# Patient Record
Sex: FEMALE | Race: WHITE | NOT HISPANIC OR LATINO | Employment: FULL TIME | URBAN - METROPOLITAN AREA
[De-identification: names, ages, dates, MRNs, and addresses within clinical notes are randomized per-mention and may not be internally consistent; named-entity substitution may affect disease eponyms.]

---

## 2019-12-04 ENCOUNTER — HOSPITAL ENCOUNTER (EMERGENCY)
Facility: MEDICAL CENTER | Age: 39
End: 2019-12-04
Attending: EMERGENCY MEDICINE
Payer: COMMERCIAL

## 2019-12-04 ENCOUNTER — APPOINTMENT (OUTPATIENT)
Dept: RADIOLOGY | Facility: MEDICAL CENTER | Age: 39
End: 2019-12-04
Attending: EMERGENCY MEDICINE
Payer: COMMERCIAL

## 2019-12-04 VITALS
OXYGEN SATURATION: 98 % | DIASTOLIC BLOOD PRESSURE: 96 MMHG | TEMPERATURE: 97.9 F | RESPIRATION RATE: 18 BRPM | HEIGHT: 67 IN | WEIGHT: 174 LBS | SYSTOLIC BLOOD PRESSURE: 140 MMHG | BODY MASS INDEX: 27.31 KG/M2 | HEART RATE: 95 BPM

## 2019-12-04 DIAGNOSIS — S92.351A CLOSED FRACTURE OF BASE OF FIFTH METATARSAL BONE OF RIGHT FOOT: ICD-10-CM

## 2019-12-04 PROCEDURE — 73610 X-RAY EXAM OF ANKLE: CPT | Mod: RT

## 2019-12-04 PROCEDURE — 73630 X-RAY EXAM OF FOOT: CPT | Mod: RT

## 2019-12-04 PROCEDURE — 99283 EMERGENCY DEPT VISIT LOW MDM: CPT

## 2019-12-04 NOTE — ED PROVIDER NOTES
"ED Provider Note    CHIEF COMPLAINT  Chief Complaint   Patient presents with   • Ankle Injury   • Foot Pain       HPI  Berenice Ng is a 39 y.o. female who presents for evaluation of right lateral foot pain after stepping on her foot run.  Patient states she was walking across the living room when she excellently's stepped on her right foot while her toes were curled under.  She stepped directly onto her toes and felt a \"snap\" in the right side of her foot.  She had pain all the way up to her hip at the time however currently it is only in the lateral portion of the foot and ankle.    REVIEW OF SYSTEMS  GEN: No fevers or chills  SKIN: No rashes  MS: Right lateral foot pain.  NEURO: No sensory or motor changes.     SOCIAL HISTORY  Social History     Tobacco Use   • Smoking status: Never Smoker   • Smokeless tobacco: Never Used   Substance and Sexual Activity   • Alcohol use: Yes   • Drug use: Never   • Sexual activity: Not on file       SURGICAL HISTORY  patient denies any surgical history    CURRENT MEDICATIONS  Home Medications    **Home medications have not yet been reviewed for this encounter**         ALLERGIES  No Known Allergies    PHYSICAL EXAM  VITAL SIGNS: /96   Pulse 95   Temp 36.6 °C (97.9 °F) (Oral)   Resp 18   Ht 1.702 m (5' 7\")   Wt 78.9 kg (174 lb)   SpO2 98%   BMI 27.25 kg/m²    GEN: Alert in no apparent distress.  SKIN: Warm, Dry, No erythema, No rash.  CV: Regular rate and rythm, capillary refill less than 3 seconds to affected foot/toes  NEURO: Sensation intact to light touch to entire affected foot  MS: Tenderness and swelling noted to the base of the right fifth metatarsal area extending up toward the lateral malleolus.  There is no calf pain, Homans sign, knee tenderness or pain, no fibular head tenderness.  Patient is able to range knee and hip without distress      LABS  No results found for this or any previous visit.    RADIOLOGY  DX-FOOT-COMPLETE 3+ RIGHT   Final " Result      Apparent subtle nondisplaced fracture through the base of the fifth metatarsal with lateral soft tissue swelling.      DX-ANKLE 3+ VIEWS RIGHT   Final Result      Normal ankle series.          COURSE & MEDICAL DECISION MAKING  Pertinent Labs & Imaging studies reviewed. (See chart for details)  Patient arrives for evaluation of foot pain and has evidence for a small nondisplaced fracture to the base of the fifth metatarsal.  This could be a subtle Martinez fracture and I will place the patient in a boot with crutches and have her follow-up with the orthopedic surgeon.  She stated clear understanding she needed to keep weight off the foot until she sees the orthopedic surgeon.  She had no other findings or symptoms to suggest the need for imaging beyond plain films of her foot and ankle.  FINAL IMPRESSION  1. Closed fracture of base of fifth metatarsal bone of right foot             Electronically signed by: Nawaf Valdez, 12/4/2019 1:43 AM

## 2019-12-18 ENCOUNTER — OFFICE VISIT (OUTPATIENT)
Dept: MEDICAL GROUP | Facility: PHYSICIAN GROUP | Age: 39
End: 2019-12-18
Payer: COMMERCIAL

## 2019-12-18 VITALS
BODY MASS INDEX: 27 KG/M2 | OXYGEN SATURATION: 98 % | HEIGHT: 67 IN | RESPIRATION RATE: 16 BRPM | TEMPERATURE: 99.5 F | DIASTOLIC BLOOD PRESSURE: 84 MMHG | WEIGHT: 172 LBS | HEART RATE: 70 BPM | SYSTOLIC BLOOD PRESSURE: 124 MMHG

## 2019-12-18 DIAGNOSIS — Z00.00 WELLNESS EXAMINATION: ICD-10-CM

## 2019-12-18 DIAGNOSIS — S92.901S CLOSED FRACTURE OF RIGHT FOOT, SEQUELA: ICD-10-CM

## 2019-12-18 DIAGNOSIS — Z76.89 ENCOUNTER TO ESTABLISH CARE WITH NEW DOCTOR: ICD-10-CM

## 2019-12-18 PROBLEM — S92.901A CLOSED FRACTURE OF BONE OF RIGHT FOOT: Status: ACTIVE | Noted: 2019-12-18

## 2019-12-18 PROCEDURE — 99203 OFFICE O/P NEW LOW 30 MIN: CPT | Performed by: NURSE PRACTITIONER

## 2019-12-18 RX ORDER — FLUCONAZOLE 150 MG/1
TABLET ORAL
COMMUNITY
Start: 2019-10-04 | End: 2019-12-18

## 2019-12-18 RX ORDER — IBUPROFEN 600 MG/1
600 TABLET ORAL
COMMUNITY
Start: 2019-10-04 | End: 2020-02-11

## 2019-12-18 ASSESSMENT — PATIENT HEALTH QUESTIONNAIRE - PHQ9: CLINICAL INTERPRETATION OF PHQ2 SCORE: 0

## 2019-12-18 NOTE — PROGRESS NOTES
Chief Complaint   Patient presents with   • Establish Care       HISTORY OF PRESENT ILLNESS: Patient is a 39 y.o. female new patient who presents today to discuss the following issues:    Encounter to establish care with new doctor  Is here to establish care with a new provider.  She is due for labs.    Closed fracture of bone of right foot  Tripped in her living room.  Followed by Dr. Mares.  Selena in a walking boot.      Patient Active Problem List    Diagnosis Date Noted   • Encounter to establish care with new doctor 12/18/2019   • Closed fracture of bone of right foot 12/18/2019       Allergies:Patient has no known allergies.    Current Outpatient Medications   Medication Sig Dispense Refill   • ibuprofen (MOTRIN) 600 MG Tab Take 600 mg by mouth.       No current facility-administered medications for this visit.        Social History     Tobacco Use   • Smoking status: Never Smoker   • Smokeless tobacco: Never Used   Substance Use Topics   • Alcohol use: Yes     Comment: occ   • Drug use: Never       No family status information on file.   History reviewed. No pertinent family history.    Review of Systems:   Constitutional: Negative for fever, chills, weight loss and malaise/fatigue.   HENT: Negative for ear pain, nosebleeds, congestion, sore throat and neck pain.    Eyes: Negative for blurred vision.   Respiratory: Negative for cough, sputum production, shortness of breath and wheezing.    Cardiovascular: Negative for chest pain, palpitations, orthopnea and leg swelling.   Gastrointestinal: Negative for heartburn, nausea, vomiting and abdominal pain.   Genitourinary: Negative for dysuria, urgency and frequency.   Musculoskeletal: Negative for myalgias, joint pain, and back pain. Right foot broken.  Skin: Negative for rash and itching.   Neurological: Negative for dizziness, tingling, tremors, sensory change, focal weakness and headaches.   Endo/Heme/Allergies: Does not bruise/bleed easily.  "  Psychiatric/Behavioral: Negative for depression, suicidal ideas and memory loss.  The patient is not nervous/anxious and does not have insomnia.    All other systems reviewed and are negative except as in HPI.    Exam:  Blood Pressure 124/84   Pulse 70   Temperature 37.5 °C (99.5 °F)   Respiration 16   Height 1.702 m (5' 7\")   Weight 78 kg (172 lb)   Oxygen Saturation 98%   General:  Well nourished, well developed female in NAD  Head: Grossly normal.  Neck: Supple without JVD or bruit. Thyroid is not enlarged.  Pulmonary: Clear to ausculation. Normal effort. No rales, ronchi, or wheezing.  Cardiovascular: Regular rate and rhythm without murmur.   Abdomen:  Bowel sounds + x 4. Soft, non-tender, nondistended.  Extremities: No clubbing, cyanosis, or edema. Right foot in walking boot.  Skin: Intact with no obvious rashes or lesions.  Neuro: Grossly intact.  Psych: Alert and oriented x 3.  Mood and affect appropriate.    Medical decision-making and discussion: Berenice is here to establish with a new primary care provider.  We reviewed her past medical history and discussed her current medications.  Lab work was ordered. She will sign a records release for her previous provider, [facility], and she will plan to follow-up here as needed.         Assessment/Plan:  1. Encounter to establish care with new doctor     2. Closed fracture of right foot, sequela     3. Wellness examination  CBC WITH DIFFERENTIAL    Comp Metabolic Panel    Lipid Profile    TSH    VITAMIN D,25 HYDROXY       Return if symptoms worsen or fail to improve.    Please note that this dictation was created using voice recognition software. I have made every reasonable attempt to correct obvious errors, but I expect that there are errors of grammar and possibly content that I did not discover before finalizing the note.    "

## 2020-01-12 ENCOUNTER — OFFICE VISIT (OUTPATIENT)
Dept: URGENT CARE | Facility: PHYSICIAN GROUP | Age: 40
End: 2020-01-12
Payer: COMMERCIAL

## 2020-01-12 VITALS
RESPIRATION RATE: 16 BRPM | BODY MASS INDEX: 27.47 KG/M2 | DIASTOLIC BLOOD PRESSURE: 76 MMHG | HEIGHT: 67 IN | SYSTOLIC BLOOD PRESSURE: 112 MMHG | WEIGHT: 175 LBS | HEART RATE: 107 BPM | TEMPERATURE: 98.4 F | OXYGEN SATURATION: 96 %

## 2020-01-12 DIAGNOSIS — J01.90 ACUTE NON-RECURRENT SINUSITIS, UNSPECIFIED LOCATION: ICD-10-CM

## 2020-01-12 PROCEDURE — 99214 OFFICE O/P EST MOD 30 MIN: CPT | Performed by: PHYSICIAN ASSISTANT

## 2020-01-12 RX ORDER — AMOXICILLIN AND CLAVULANATE POTASSIUM 875; 125 MG/1; MG/1
1 TABLET, FILM COATED ORAL 2 TIMES DAILY
Qty: 14 TAB | Refills: 0 | Status: SHIPPED | OUTPATIENT
Start: 2020-01-12 | End: 2020-01-19

## 2020-01-12 RX ORDER — FLUCONAZOLE 150 MG/1
150 TABLET ORAL ONCE
Qty: 1 TAB | Refills: 0 | Status: SHIPPED | OUTPATIENT
Start: 2020-01-12 | End: 2020-01-12

## 2020-01-12 RX ORDER — BENZONATATE 200 MG/1
200 CAPSULE ORAL 2 TIMES DAILY PRN
Qty: 30 CAP | Refills: 0 | Status: SHIPPED | OUTPATIENT
Start: 2020-01-12 | End: 2020-02-11

## 2020-01-12 ASSESSMENT — ENCOUNTER SYMPTOMS
HOARSE VOICE: 0
NAUSEA: 0
VOMITING: 0
SHORTNESS OF BREATH: 0
PALPITATIONS: 0
WHEEZING: 0
FEVER: 1
DIARRHEA: 0
SWOLLEN GLANDS: 0
CHILLS: 1
SORE THROAT: 0
MYALGIAS: 1
ABDOMINAL PAIN: 0
COUGH: 1
SINUS PRESSURE: 1
HEADACHES: 1
SINUS PAIN: 1
SPUTUM PRODUCTION: 1

## 2020-01-12 NOTE — PROGRESS NOTES
"Subjective:      Berenice Ng is a 39 y.o. female who presents with Cough (congestion, sinus pressure  ear pain x 2 weeks)            Sinus Problem   This is a new problem. Episode onset: 2 weeks  The problem is unchanged. The maximum temperature recorded prior to her arrival was 101 - 101.9 F. The pain is moderate. Associated symptoms include chills, congestion, coughing, ear pain (bilateral), headaches and sinus pressure. Pertinent negatives include no hoarse voice, shortness of breath, sore throat or swollen glands. Past treatments include oral decongestants (OTC cough suppressants). Improvement on treatment: minimal relief        No past medical history on file.    No past surgical history on file.    No family history on file.    No Known Allergies    Medications, Allergies, and current problem list reviewed today in Epic    Review of Systems   Constitutional: Positive for chills, fever (last fever was yesterday ) and malaise/fatigue.   HENT: Positive for congestion, ear pain (bilateral), sinus pressure and sinus pain. Negative for ear discharge, hoarse voice and sore throat.    Respiratory: Positive for cough and sputum production. Negative for shortness of breath and wheezing.    Cardiovascular: Negative for chest pain, palpitations and leg swelling.   Gastrointestinal: Negative for abdominal pain, diarrhea, nausea and vomiting.   Musculoskeletal: Positive for myalgias.   Skin: Negative for rash.   Neurological: Positive for headaches.     All other systems reviewed and are negative.        Objective:     /76 (BP Location: Left arm, Patient Position: Sitting, BP Cuff Size: Adult)   Pulse (!) 107   Temp 36.9 °C (98.4 °F) (Temporal)   Resp 16   Ht 1.702 m (5' 7\")   Wt 79.4 kg (175 lb)   SpO2 96%   BMI 27.41 kg/m²      Physical Exam  Constitutional:       General: She is not in acute distress.     Appearance: She is not ill-appearing.   HENT:      Head: Normocephalic and atraumatic.      " Right Ear: Ear canal and external ear normal. A middle ear effusion is present.      Left Ear: Ear canal and external ear normal. A middle ear effusion is present.      Nose: Congestion and rhinorrhea present.      Right Sinus: Maxillary sinus tenderness present.      Left Sinus: Maxillary sinus tenderness present.      Mouth/Throat:      Mouth: Mucous membranes are moist.      Pharynx: Oropharynx is clear. No posterior oropharyngeal erythema.   Cardiovascular:      Rate and Rhythm: Normal rate and regular rhythm.      Heart sounds: Normal heart sounds. No murmur. No friction rub. No gallop.    Pulmonary:      Effort: Pulmonary effort is normal. No respiratory distress.      Breath sounds: Normal breath sounds. No wheezing, rhonchi or rales.   Lymphadenopathy:      Cervical: No cervical adenopathy.   Skin:     General: Skin is warm and dry.      Findings: No rash.   Neurological:      General: No focal deficit present.      Mental Status: She is alert and oriented to person, place, and time.   Psychiatric:         Mood and Affect: Mood normal.         Behavior: Behavior normal.         Thought Content: Thought content normal.         Judgment: Judgment normal.                 Assessment/Plan:     1. Acute non-recurrent sinusitis, unspecified location  amoxicillin-clavulanate (AUGMENTIN) 875-125 MG Tab    fluconazole (DIFLUCAN) 150 MG tablet    benzonatate (TESSALON) 200 MG capsule       Current Outpatient Medications:   •  amoxicillin-clavulanate (AUGMENTIN) 875-125 MG Tab, Take 1 Tab by mouth 2 times a day for 7 days., Disp: 14 Tab, Rfl: 0  •  fluconazole (DIFLUCAN) 150 MG tablet, Take 1 Tab by mouth Once for 1 dose., Disp: 1 Tab, Rfl: 0  •  benzonatate (TESSALON) 200 MG capsule, Take 1 Cap by mouth 2 times a day as needed for Cough., Disp: 30 Cap, Rfl: 0     Differential diagnoses, Supportive care, and indications for immediate follow-up discussed with patient.   Instructed to return to clinic or nearest emergency  department for any change in condition, further concerns, or worsening of symptoms.    The patient demonstrated a good understanding and agreed with the treatment plan.    Camila Mcgill P.A.-C.

## 2020-01-14 ENCOUNTER — TELEPHONE (OUTPATIENT)
Dept: MEDICAL GROUP | Facility: PHYSICIAN GROUP | Age: 40
End: 2020-01-14

## 2020-01-14 DIAGNOSIS — Z63.0 MARITAL PROBLEMS: ICD-10-CM

## 2020-01-14 SDOH — SOCIAL STABILITY - SOCIAL INSECURITY: PROBLEMS IN RELATIONSHIP WITH SPOUSE OR PARTNER: Z63.0

## 2020-01-14 NOTE — TELEPHONE ENCOUNTER
----- Message from Berenice Ng sent at 1/14/2020  9:41 AM PST -----  Regarding: Non-Urgent Medical Question  Contact: [phone]  Good Morning Jefferson, I hope this email finds you well.  My  and I would like to go to a marriage  and noticed that the one we are considering accepts our medical insurance.  Because of that, we need a referral (HMO).  Could you please consider that for us?  The facility we are looking at is [facility].  If you need any more information about it, please let me know.   Best,  Aria Ng

## 2020-02-11 ENCOUNTER — OFFICE VISIT (OUTPATIENT)
Dept: URGENT CARE | Facility: PHYSICIAN GROUP | Age: 40
End: 2020-02-11
Payer: COMMERCIAL

## 2020-02-11 VITALS
TEMPERATURE: 97.7 F | OXYGEN SATURATION: 98 % | SYSTOLIC BLOOD PRESSURE: 148 MMHG | DIASTOLIC BLOOD PRESSURE: 82 MMHG | HEART RATE: 112 BPM | WEIGHT: 175 LBS | BODY MASS INDEX: 27.47 KG/M2 | RESPIRATION RATE: 16 BRPM | HEIGHT: 67 IN

## 2020-02-11 DIAGNOSIS — J22 LOWER RESPIRATORY INFECTION (E.G., BRONCHITIS, PNEUMONIA, PNEUMONITIS, PULMONITIS): ICD-10-CM

## 2020-02-11 DIAGNOSIS — J06.9 URI, ACUTE: ICD-10-CM

## 2020-02-11 PROCEDURE — 99203 OFFICE O/P NEW LOW 30 MIN: CPT | Performed by: INTERNAL MEDICINE

## 2020-02-11 ASSESSMENT — ENCOUNTER SYMPTOMS
FEVER: 0
RHINORRHEA: 1
COUGH: 1
WHEEZING: 1
SHORTNESS OF BREATH: 1

## 2020-02-12 NOTE — PROGRESS NOTES
Subjective:     Berenice Ng is a 39 y.o. female who presents for Cough (x2days )       Cough   This is a new problem. The current episode started yesterday. The problem has been gradually worsening. The cough is productive of purulent sputum. Associated symptoms include nasal congestion, postnasal drip, rhinorrhea, shortness of breath and wheezing. Pertinent negatives include no fever.   History reviewed. No pertinent past medical history.History reviewed. No pertinent surgical history.  Social History     Socioeconomic History   • Marital status:      Spouse name: Not on file   • Number of children: Not on file   • Years of education: Not on file   • Highest education level: Not on file   Occupational History   • Not on file   Social Needs   • Financial resource strain: Not on file   • Food insecurity:     Worry: Not on file     Inability: Not on file   • Transportation needs:     Medical: Not on file     Non-medical: Not on file   Tobacco Use   • Smoking status: Never Smoker   • Smokeless tobacco: Never Used   Substance and Sexual Activity   • Alcohol use: Yes     Comment: occ   • Drug use: Never   • Sexual activity: Not on file   Lifestyle   • Physical activity:     Days per week: Not on file     Minutes per session: Not on file   • Stress: Not on file   Relationships   • Social connections:     Talks on phone: Not on file     Gets together: Not on file     Attends [employer] service: Not on file     Active member of club or organization: Not on file     Attends meetings of clubs or organizations: Not on file     Relationship status: Not on file   • Intimate partner violence:     Fear of current or ex partner: Not on file     Emotionally abused: Not on file     Physically abused: Not on file     Forced sexual activity: Not on file   Other Topics Concern   • Not on file   Social History Narrative   • Not on file    History reviewed. No pertinent family history. Review of Systems  "  Constitutional: Negative for fever.   HENT: Positive for postnasal drip and rhinorrhea.    Respiratory: Positive for cough, shortness of breath and wheezing.    All other systems reviewed and are negative.  No Known Allergies   Objective:   /82   Pulse (!) 112   Temp 36.5 °C (97.7 °F) (Temporal)   Resp 16   Ht 1.702 m (5' 7\")   Wt 79.4 kg (175 lb)   SpO2 98%   BMI 27.41 kg/m²   Physical Exam  Constitutional:       General: She is not in acute distress.     Appearance: She is well-developed.   HENT:      Head: Normocephalic and atraumatic.      Right Ear: Tympanic membrane, ear canal and external ear normal.      Left Ear: Tympanic membrane, ear canal and external ear normal.      Nose: Mucosal edema and rhinorrhea present.      Mouth/Throat:      Mouth: Mucous membranes are moist.      Pharynx: Oropharynx is clear. Posterior oropharyngeal erythema present.   Eyes:      Conjunctiva/sclera: Conjunctivae normal.   Neck:      Musculoskeletal: No neck rigidity.   Cardiovascular:      Rate and Rhythm: Normal rate and regular rhythm.   Pulmonary:      Effort: Pulmonary effort is normal. No respiratory distress.      Breath sounds: Rales present. No wheezing.   Lymphadenopathy:      Cervical: No cervical adenopathy.   Skin:     General: Skin is warm and dry.      Capillary Refill: Capillary refill takes less than 2 seconds.   Neurological:      Mental Status: She is alert and oriented to person, place, and time.      Sensory: No sensory deficit.      Deep Tendon Reflexes: Reflexes are normal and symmetric.   Psychiatric:         Mood and Affect: Mood normal.         Behavior: Behavior normal.           Assessment/Plan:   Assessment    1. URI, acute    2. Lower respiratory infection (e.g., bronchitis, pneumonia, pneumonitis, pulmonitis)    Advised patient to take Benadryl at night, Mucinex DM and, Tylenol, ibuprofen a lot of fluids and also vitamin C and zinc , flonase    Differential diagnosis, natural " history, supportive care, and indications for immediate follow-up discussed.

## 2021-01-06 NOTE — PROGRESS NOTES
Virtual Visit: Established Patient   This visit was conducted via Zoom using secure and encrypted videoconferencing technology. The patient was in a private location in the state of [place].    The patient's identity was confirmed and verbal consent was obtained for this virtual visit.    Subjective:   CC:   Chief Complaint   Patient presents with   • Establish Care   • Headache     ibuprofen refill       Berenice Ng is a 40 y.o. female presenting for evaluation and management of:    Tension headache  This is a chronic problem, stable.  Patient reports that ever since having her child about 3 years ago she occasionally gets a headache around the time of her period.  She reports that she does not have any light sensitivity, nausea, or vomiting when she has her headaches.  The headache is normally on both of her temples and feels like a pressure or on the back of her occipital area.  He has tried over-the-counter Tylenol and ibuprofen for this but it does not seem to help.  Her OB/GYN has given her 600 mg ibuprofen in the past and that really helps get rid of the headache.  She has these headaches now 2-3 times a year.    Nevus  This is a chronic problem, stable.  She reports that for the last 15 years she has had multiple on her body that she wants to get checked out by a provider.  She is particularly she has one on her left thigh and one on her right chest wall.  She reports that the mole on her thigh has slightly changed over the last 2 years and has gotten lighter in color and seems to be not as pronounced.  The mole on her chest wall has not changed in over 15 years.      ROS   Denies any recent fevers or chills. No nausea or vomiting. No chest pains or shortness of breath. Positive for nevus and headache.     No Known Allergies    Current medicines (including changes today)  Current Outpatient Medications   Medication Sig Dispense Refill   • ibuprofen (MOTRIN) 600 MG Tab Take 1 Tab by mouth every 6  "hours as needed. 10 Tab 1     No current facility-administered medications for this visit.        Patient Active Problem List    Diagnosis Date Noted   • Tension headache 01/07/2021   • Nevus 01/07/2021   • Encounter to establish care with new doctor 12/18/2019       Family History   Problem Relation Age of Onset   • No Known Problems Mother    • Dementia Maternal Grandmother         Alzheimers   • Parkinson's Disease Maternal Grandmother    • Glaucoma Maternal Grandmother    • Heart Disease Maternal Grandfather 60        2 MIs, CAD   • Dementia Maternal Grandfather        She  has no pertinent past medical history.  She  has a past surgical history that includes knee arthroscopy,joint debridment,open patella tendon reconsn (Left, 2008).       Objective:   Ht 1.702 m (5' 7\") Comment: per pt  Wt 76.7 kg (169 lb 3.2 oz) Comment: per pt  LMP 12/22/2020 (Within Days)   Breastfeeding No   BMI 26.50 kg/m²  RR: 12    Physical Exam:  Constitutional: Alert, no distress, well-groomed.  Skin: No rashes in visible areas.  Eye: Round. Conjunctiva clear, lids normal. No icterus.   ENMT: Lips pink without lesions, good dentition, moist mucous membranes. Phonation normal.  Neck: No masses, no thyromegaly. Moves freely without pain.  Respiratory: Unlabored respiratory effort, no cough or audible wheeze  Psych: Alert and oriented x3, normal affect and mood.       Assessment and Plan:   The following treatment plan was discussed:       1. Tension headache  This is a chronic, stable.  Patient reports that ever since having her child about 3 years ago which she occasionally gets what sounds like tension headaches around the time of her menstrual cycle.  She previously had an IUD in place that was recently removed due to recurrent infections.  Ever since having her IUD removed she reports her headaches have greatly improved.  Patient has had a prescription for 600 mg ibuprofen which she states always gets rid of her headache with 1 " dose.  She reports only getting these headaches 2-3 times a year.  Instructed patient to continue to take the 600 mg ibuprofen with food for her headaches as needed.  She will follow up with me if the symptoms are getting worse or she has any new concerns.  - ibuprofen (MOTRIN) 600 MG Tab; Take 1 Tab by mouth every 6 hours as needed.; Refill: 0    2. Nevus  This is chronic, stable.  Patient reports that she has 2 moles that she would like to be seen by a provider.  One is on her left thigh that she states is slightly changed in the last 2 years by getting lighter and seems to be not as pronounced.  He also has 1 mole on her right chest wall that she has had for her entire life and has not changed in the last 15 years.  I counseled the patient that given the virtual nature of our visit is difficult for me to fully evaluate these moles.  Educated her about warning signs for questionable skin cancer, and urged her to make an in person appointment with me if she would like them to be evaluated further.         Follow-up: Return in about 1 year (around 1/7/2022) for annual wellness.  Or sooner she would like her moles to be evaluated.  Follow-up on the labs with the patient once I receive the results.     Please note that this dictation was created using voice recognition software. I have made every reasonable attempt to correct obvious errors, but I expect that there are errors of grammar and possibly content that I did not discover before finalizing the note.

## 2021-01-07 ENCOUNTER — TELEMEDICINE (OUTPATIENT)
Dept: MEDICAL GROUP | Facility: PHYSICIAN GROUP | Age: 41
End: 2021-01-07
Payer: COMMERCIAL

## 2021-01-07 VITALS — BODY MASS INDEX: 26.56 KG/M2 | HEIGHT: 67 IN | WEIGHT: 169.2 LBS

## 2021-01-07 DIAGNOSIS — Z76.89 ENCOUNTER TO ESTABLISH CARE WITH NEW DOCTOR: ICD-10-CM

## 2021-01-07 DIAGNOSIS — D22.9 NEVUS: ICD-10-CM

## 2021-01-07 DIAGNOSIS — G44.209 TENSION HEADACHE: ICD-10-CM

## 2021-01-07 DIAGNOSIS — Z13.29 SCREENING FOR ENDOCRINE DISORDER: ICD-10-CM

## 2021-01-07 PROBLEM — S92.901A CLOSED FRACTURE OF BONE OF RIGHT FOOT: Status: RESOLVED | Noted: 2019-12-18 | Resolved: 2021-01-07

## 2021-01-07 PROCEDURE — 99213 OFFICE O/P EST LOW 20 MIN: CPT | Mod: 95,CR | Performed by: PHYSICIAN ASSISTANT

## 2021-01-07 RX ORDER — IBUPROFEN 600 MG/1
600 TABLET ORAL EVERY 6 HOURS PRN
Qty: 10 TAB | Refills: 1 | Status: ON HOLD | OUTPATIENT
Start: 2021-01-07 | End: 2021-11-17

## 2021-01-07 SDOH — HEALTH STABILITY: MENTAL HEALTH: HOW OFTEN DO YOU HAVE A DRINK CONTAINING ALCOHOL?: NEVER

## 2021-01-07 ASSESSMENT — PATIENT HEALTH QUESTIONNAIRE - PHQ9: CLINICAL INTERPRETATION OF PHQ2 SCORE: 0

## 2021-01-07 NOTE — ASSESSMENT & PLAN NOTE
This is a chronic problem, stable.  Patient reports that ever since having her child about 3 years ago she occasionally gets a headache around the time of her..  She reports that she does not have any light sensitivity, nausea, or vomiting when she has her headaches.  The headache is normally on both of her temples and feels like a pressure or on the back of her occipital area.  He has tried over-the-counter Tylenol and ibuprofen for this but it does not seem to help.  Her OB/GYN has given her 600 mg ibuprofen in the past and that really helps get rid of the headache.  She has these headaches now 2-3 times a year.

## 2021-01-07 NOTE — ASSESSMENT & PLAN NOTE
This is a chronic problem, stable.  She reports that for the last 15 years she has had multiple on her body that she wants to get checked out by a provider.  She is particularly she has one on her left thigh and one on her right chest wall.  She reports that the mole on her thigh has slightly changed over the last 2 years and has gotten lighter in color and seems to be not as pronounced.  The mole on her chest wall has not changed in over 15 years.

## 2021-09-15 ENCOUNTER — HOSPITAL ENCOUNTER (OUTPATIENT)
Facility: MEDICAL CENTER | Age: 41
End: 2021-09-15
Attending: OBSTETRICS & GYNECOLOGY
Payer: COMMERCIAL

## 2021-09-15 PROCEDURE — 86769 SARS-COV-2 COVID-19 ANTIBODY: CPT

## 2021-09-15 PROCEDURE — 84443 ASSAY THYROID STIM HORMONE: CPT

## 2021-09-15 PROCEDURE — 84146 ASSAY OF PROLACTIN: CPT

## 2021-09-16 LAB
PROLACTIN SERPL-MCNC: 17.4 NG/ML (ref 2.8–26)
SARS-COV-2 AB SERPL QL IA: NORMAL
TSH SERPL DL<=0.005 MIU/L-ACNC: 1.61 UIU/ML (ref 0.38–5.33)

## 2021-09-23 ENCOUNTER — HOSPITAL ENCOUNTER (OUTPATIENT)
Facility: MEDICAL CENTER | Age: 41
End: 2021-09-23
Attending: FAMILY MEDICINE
Payer: COMMERCIAL

## 2021-09-23 ENCOUNTER — OFFICE VISIT (OUTPATIENT)
Dept: URGENT CARE | Facility: PHYSICIAN GROUP | Age: 41
End: 2021-09-23
Payer: COMMERCIAL

## 2021-09-23 VITALS
WEIGHT: 180 LBS | BODY MASS INDEX: 28.25 KG/M2 | SYSTOLIC BLOOD PRESSURE: 124 MMHG | TEMPERATURE: 98.1 F | RESPIRATION RATE: 20 BRPM | HEART RATE: 96 BPM | OXYGEN SATURATION: 96 % | DIASTOLIC BLOOD PRESSURE: 68 MMHG | HEIGHT: 67 IN

## 2021-09-23 DIAGNOSIS — Z20.822 EXPOSURE TO COVID-19 VIRUS: ICD-10-CM

## 2021-09-23 PROCEDURE — U0003 INFECTIOUS AGENT DETECTION BY NUCLEIC ACID (DNA OR RNA); SEVERE ACUTE RESPIRATORY SYNDROME CORONAVIRUS 2 (SARS-COV-2) (CORONAVIRUS DISEASE [COVID-19]), AMPLIFIED PROBE TECHNIQUE, MAKING USE OF HIGH THROUGHPUT TECHNOLOGIES AS DESCRIBED BY CMS-2020-01-R: HCPCS

## 2021-09-23 PROCEDURE — 99212 OFFICE O/P EST SF 10 MIN: CPT | Mod: CS | Performed by: FAMILY MEDICINE

## 2021-09-23 PROCEDURE — U0005 INFEC AGEN DETEC AMPLI PROBE: HCPCS

## 2021-09-23 NOTE — PROGRESS NOTES
"Subjective     Aria Ng is a 41 y.o. female who presents with Other (exposed to covid. )      - This is a pleasant and nontoxic appearing 41 y.o. female with c/o exposed to c19 about 5-6 days ago. Asymptomatic. Wants a c19 test       ALLERGIES:  Other drug     PMH:  History reviewed. No pertinent past medical history.     PSH:  Past Surgical History:   Procedure Laterality Date   • KNEE ARTHROSCOPY,JOINT DEBRIDMENT,OPEN PATELLA TENDON RECONSN Left 2008    Patient reports having multiple scopes of her left knee with the last one being in 2008       MEDS:    Current Outpatient Medications:   •  ibuprofen (MOTRIN) 600 MG Tab, Take 1 Tab by mouth every 6 hours as needed., Disp: 10 Tab, Rfl: 1    ** I have documented what I find to be significant in regards to past medical, social, family and surgical history  in my HPI or under PMH/PSH/FH review section, otherwise it is noncontributory **           HPI    Review of Systems   All other systems reviewed and are negative.             Objective     /68 (BP Location: Left arm, Patient Position: Sitting, BP Cuff Size: Adult)   Pulse 96   Temp 36.7 °C (98.1 °F) (Temporal)   Resp 20   Ht 1.702 m (5' 7\")   Wt 81.6 kg (180 lb)   SpO2 96%   BMI 28.19 kg/m²      Physical Exam  Vitals and nursing note reviewed.   Constitutional:       General: She is not in acute distress.     Appearance: Normal appearance. She is well-developed.   HENT:      Head: Normocephalic.   Cardiovascular:      Heart sounds: Normal heart sounds. No murmur heard.     Pulmonary:      Effort: Pulmonary effort is normal. No respiratory distress.   Skin:     Coloration: Skin is not jaundiced or pale.   Neurological:      Mental Status: She is alert.      Motor: No abnormal muscle tone.   Psychiatric:         Mood and Affect: Mood normal.         Behavior: Behavior normal.              Assessment & Plan          1. Exposure to COVID-19 virus         - Dx, plan & d/c instructions discussed "   - f/ prn

## 2021-09-24 LAB
COVID ORDER STATUS COVID19: NORMAL
SARS-COV-2 RNA RESP QL NAA+PROBE: NOTDETECTED
SPECIMEN SOURCE: NORMAL

## 2021-11-04 ENCOUNTER — PRE-ADMISSION TESTING (OUTPATIENT)
Dept: ADMISSIONS | Facility: MEDICAL CENTER | Age: 41
End: 2021-11-04
Attending: OBSTETRICS & GYNECOLOGY
Payer: COMMERCIAL

## 2021-11-04 DIAGNOSIS — Z01.812 PRE-OPERATIVE LABORATORY EXAMINATION: ICD-10-CM

## 2021-11-04 LAB
ABO GROUP BLD: NORMAL
ALBUMIN SERPL BCP-MCNC: 4.4 G/DL (ref 3.2–4.9)
ALBUMIN/GLOB SERPL: 1.7 G/DL
ALP SERPL-CCNC: 59 U/L (ref 30–99)
ALT SERPL-CCNC: 16 U/L (ref 2–50)
ANION GAP SERPL CALC-SCNC: 9 MMOL/L (ref 7–16)
APPEARANCE UR: CLEAR
AST SERPL-CCNC: 18 U/L (ref 12–45)
BACTERIA #/AREA URNS HPF: NEGATIVE /HPF
BASOPHILS # BLD AUTO: 0.9 % (ref 0–1.8)
BASOPHILS # BLD: 0.06 K/UL (ref 0–0.12)
BILIRUB SERPL-MCNC: 0.4 MG/DL (ref 0.1–1.5)
BILIRUB UR QL STRIP.AUTO: NEGATIVE
BLD GP AB SCN SERPL QL: NORMAL
BUN SERPL-MCNC: 6 MG/DL (ref 8–22)
CALCIUM SERPL-MCNC: 9.1 MG/DL (ref 8.5–10.5)
CHLORIDE SERPL-SCNC: 105 MMOL/L (ref 96–112)
CO2 SERPL-SCNC: 26 MMOL/L (ref 20–33)
COLOR UR: YELLOW
CREAT SERPL-MCNC: 0.55 MG/DL (ref 0.5–1.4)
EOSINOPHIL # BLD AUTO: 0.19 K/UL (ref 0–0.51)
EOSINOPHIL NFR BLD: 2.7 % (ref 0–6.9)
EPI CELLS #/AREA URNS HPF: NEGATIVE /HPF
ERYTHROCYTE [DISTWIDTH] IN BLOOD BY AUTOMATED COUNT: 45 FL (ref 35.9–50)
GLOBULIN SER CALC-MCNC: 2.6 G/DL (ref 1.9–3.5)
GLUCOSE SERPL-MCNC: 89 MG/DL (ref 65–99)
GLUCOSE UR STRIP.AUTO-MCNC: NEGATIVE MG/DL
HCG SERPL QL: NEGATIVE
HCT VFR BLD AUTO: 43.4 % (ref 37–47)
HGB BLD-MCNC: 14.7 G/DL (ref 12–16)
HYALINE CASTS #/AREA URNS LPF: NORMAL /LPF
IMM GRANULOCYTES # BLD AUTO: 0.02 K/UL (ref 0–0.11)
IMM GRANULOCYTES NFR BLD AUTO: 0.3 % (ref 0–0.9)
KETONES UR STRIP.AUTO-MCNC: NEGATIVE MG/DL
LEUKOCYTE ESTERASE UR QL STRIP.AUTO: NEGATIVE
LYMPHOCYTES # BLD AUTO: 2.83 K/UL (ref 1–4.8)
LYMPHOCYTES NFR BLD: 40.3 % (ref 22–41)
MCH RBC QN AUTO: 31.1 PG (ref 27–33)
MCHC RBC AUTO-ENTMCNC: 33.9 G/DL (ref 33.6–35)
MCV RBC AUTO: 91.8 FL (ref 81.4–97.8)
MICRO URNS: ABNORMAL
MONOCYTES # BLD AUTO: 0.57 K/UL (ref 0–0.85)
MONOCYTES NFR BLD AUTO: 8.1 % (ref 0–13.4)
NEUTROPHILS # BLD AUTO: 3.36 K/UL (ref 2–7.15)
NEUTROPHILS NFR BLD: 47.7 % (ref 44–72)
NITRITE UR QL STRIP.AUTO: NEGATIVE
NRBC # BLD AUTO: 0 K/UL
NRBC BLD-RTO: 0 /100 WBC
PH UR STRIP.AUTO: 7 [PH] (ref 5–8)
PLATELET # BLD AUTO: 312 K/UL (ref 164–446)
PMV BLD AUTO: 9.4 FL (ref 9–12.9)
POTASSIUM SERPL-SCNC: 4.2 MMOL/L (ref 3.6–5.5)
PROT SERPL-MCNC: 7 G/DL (ref 6–8.2)
PROT UR QL STRIP: NEGATIVE MG/DL
RBC # BLD AUTO: 4.73 M/UL (ref 4.2–5.4)
RBC # URNS HPF: NORMAL /HPF
RBC UR QL AUTO: ABNORMAL
RH BLD: NORMAL
SODIUM SERPL-SCNC: 140 MMOL/L (ref 135–145)
SP GR UR STRIP.AUTO: 1.01
UROBILINOGEN UR STRIP.AUTO-MCNC: 0.2 MG/DL
WBC # BLD AUTO: 7 K/UL (ref 4.8–10.8)
WBC #/AREA URNS HPF: NORMAL /HPF

## 2021-11-04 PROCEDURE — 80053 COMPREHEN METABOLIC PANEL: CPT

## 2021-11-04 PROCEDURE — 86850 RBC ANTIBODY SCREEN: CPT

## 2021-11-04 PROCEDURE — 86900 BLOOD TYPING SEROLOGIC ABO: CPT

## 2021-11-04 PROCEDURE — 85025 COMPLETE CBC W/AUTO DIFF WBC: CPT

## 2021-11-04 PROCEDURE — 84703 CHORIONIC GONADOTROPIN ASSAY: CPT

## 2021-11-04 PROCEDURE — 36415 COLL VENOUS BLD VENIPUNCTURE: CPT

## 2021-11-04 PROCEDURE — 86901 BLOOD TYPING SEROLOGIC RH(D): CPT

## 2021-11-04 PROCEDURE — 81001 URINALYSIS AUTO W/SCOPE: CPT

## 2021-11-12 ENCOUNTER — PRE-ADMISSION TESTING (OUTPATIENT)
Dept: ADMISSIONS | Facility: MEDICAL CENTER | Age: 41
End: 2021-11-12
Attending: OBSTETRICS & GYNECOLOGY
Payer: COMMERCIAL

## 2021-11-12 DIAGNOSIS — Z01.812 PRE-OPERATIVE LABORATORY EXAMINATION: ICD-10-CM

## 2021-11-12 PROCEDURE — U0003 INFECTIOUS AGENT DETECTION BY NUCLEIC ACID (DNA OR RNA); SEVERE ACUTE RESPIRATORY SYNDROME CORONAVIRUS 2 (SARS-COV-2) (CORONAVIRUS DISEASE [COVID-19]), AMPLIFIED PROBE TECHNIQUE, MAKING USE OF HIGH THROUGHPUT TECHNOLOGIES AS DESCRIBED BY CMS-2020-01-R: HCPCS

## 2021-11-12 PROCEDURE — U0005 INFEC AGEN DETEC AMPLI PROBE: HCPCS

## 2021-11-13 LAB — COVID ORDER STATUS COVID19: NORMAL

## 2021-11-14 LAB
SARS-COV-2 RNA RESP QL NAA+PROBE: NOTDETECTED
SPECIMEN SOURCE: NORMAL

## 2021-11-17 ENCOUNTER — HOSPITAL ENCOUNTER (OUTPATIENT)
Facility: MEDICAL CENTER | Age: 41
End: 2021-11-17
Attending: OBSTETRICS & GYNECOLOGY | Admitting: OBSTETRICS & GYNECOLOGY
Payer: COMMERCIAL

## 2021-11-17 ENCOUNTER — ANESTHESIA (OUTPATIENT)
Dept: SURGERY | Facility: MEDICAL CENTER | Age: 41
End: 2021-11-17
Payer: COMMERCIAL

## 2021-11-17 ENCOUNTER — ANESTHESIA EVENT (OUTPATIENT)
Dept: SURGERY | Facility: MEDICAL CENTER | Age: 41
End: 2021-11-17
Payer: COMMERCIAL

## 2021-11-17 VITALS
BODY MASS INDEX: 27.65 KG/M2 | WEIGHT: 176.15 LBS | DIASTOLIC BLOOD PRESSURE: 70 MMHG | HEART RATE: 88 BPM | OXYGEN SATURATION: 96 % | HEIGHT: 67 IN | RESPIRATION RATE: 16 BRPM | TEMPERATURE: 98 F | SYSTOLIC BLOOD PRESSURE: 125 MMHG

## 2021-11-17 DIAGNOSIS — R11.0 NAUSEA: ICD-10-CM

## 2021-11-17 DIAGNOSIS — G89.18 POSTOPERATIVE PAIN: ICD-10-CM

## 2021-11-17 LAB
ABO + RH BLD: NORMAL
HCG UR QL: NEGATIVE
PATHOLOGY CONSULT NOTE: NORMAL

## 2021-11-17 PROCEDURE — 700101 HCHG RX REV CODE 250: Performed by: ANESTHESIOLOGY

## 2021-11-17 PROCEDURE — 700101 HCHG RX REV CODE 250: Performed by: OBSTETRICS & GYNECOLOGY

## 2021-11-17 PROCEDURE — 160002 HCHG RECOVERY MINUTES (STAT): Performed by: OBSTETRICS & GYNECOLOGY

## 2021-11-17 PROCEDURE — 160009 HCHG ANES TIME/MIN: Performed by: OBSTETRICS & GYNECOLOGY

## 2021-11-17 PROCEDURE — 160029 HCHG SURGERY MINUTES - 1ST 30 MINS LEVEL 4: Performed by: OBSTETRICS & GYNECOLOGY

## 2021-11-17 PROCEDURE — 160025 RECOVERY II MINUTES (STATS): Performed by: OBSTETRICS & GYNECOLOGY

## 2021-11-17 PROCEDURE — 88305 TISSUE EXAM BY PATHOLOGIST: CPT

## 2021-11-17 PROCEDURE — 700111 HCHG RX REV CODE 636 W/ 250 OVERRIDE (IP): Performed by: ANESTHESIOLOGY

## 2021-11-17 PROCEDURE — 160041 HCHG SURGERY MINUTES - EA ADDL 1 MIN LEVEL 4: Performed by: OBSTETRICS & GYNECOLOGY

## 2021-11-17 PROCEDURE — 160048 HCHG OR STATISTICAL LEVEL 1-5: Performed by: OBSTETRICS & GYNECOLOGY

## 2021-11-17 PROCEDURE — 502587 HCHG PACK, D&C: Performed by: OBSTETRICS & GYNECOLOGY

## 2021-11-17 PROCEDURE — 81025 URINE PREGNANCY TEST: CPT

## 2021-11-17 PROCEDURE — 160035 HCHG PACU - 1ST 60 MINS PHASE I: Performed by: OBSTETRICS & GYNECOLOGY

## 2021-11-17 PROCEDURE — 160046 HCHG PACU - 1ST 60 MINS PHASE II: Performed by: OBSTETRICS & GYNECOLOGY

## 2021-11-17 RX ORDER — MIDAZOLAM HYDROCHLORIDE 1 MG/ML
INJECTION INTRAMUSCULAR; INTRAVENOUS PRN
Status: DISCONTINUED | OUTPATIENT
Start: 2021-11-17 | End: 2021-11-17 | Stop reason: SURG

## 2021-11-17 RX ORDER — SODIUM CHLORIDE, SODIUM LACTATE, POTASSIUM CHLORIDE, CALCIUM CHLORIDE 600; 310; 30; 20 MG/100ML; MG/100ML; MG/100ML; MG/100ML
INJECTION, SOLUTION INTRAVENOUS CONTINUOUS
Status: DISCONTINUED | OUTPATIENT
Start: 2021-11-17 | End: 2021-11-17 | Stop reason: HOSPADM

## 2021-11-17 RX ORDER — KETOROLAC TROMETHAMINE 30 MG/ML
INJECTION, SOLUTION INTRAMUSCULAR; INTRAVENOUS PRN
Status: DISCONTINUED | OUTPATIENT
Start: 2021-11-17 | End: 2021-11-17 | Stop reason: SURG

## 2021-11-17 RX ORDER — HALOPERIDOL 5 MG/ML
1 INJECTION INTRAMUSCULAR
Status: DISCONTINUED | OUTPATIENT
Start: 2021-11-17 | End: 2021-11-17 | Stop reason: HOSPADM

## 2021-11-17 RX ORDER — ONDANSETRON 2 MG/ML
4 INJECTION INTRAMUSCULAR; INTRAVENOUS
Status: DISCONTINUED | OUTPATIENT
Start: 2021-11-17 | End: 2021-11-17 | Stop reason: HOSPADM

## 2021-11-17 RX ORDER — OXYCODONE HCL 5 MG/5 ML
10 SOLUTION, ORAL ORAL
Status: DISCONTINUED | OUTPATIENT
Start: 2021-11-17 | End: 2021-11-17 | Stop reason: HOSPADM

## 2021-11-17 RX ORDER — SODIUM CHLORIDE, SODIUM LACTATE, POTASSIUM CHLORIDE, CALCIUM CHLORIDE 600; 310; 30; 20 MG/100ML; MG/100ML; MG/100ML; MG/100ML
INJECTION, SOLUTION INTRAVENOUS CONTINUOUS
Status: ACTIVE | OUTPATIENT
Start: 2021-11-17 | End: 2021-11-17

## 2021-11-17 RX ORDER — MEPERIDINE HYDROCHLORIDE 25 MG/ML
6.25 INJECTION INTRAMUSCULAR; INTRAVENOUS; SUBCUTANEOUS
Status: DISCONTINUED | OUTPATIENT
Start: 2021-11-17 | End: 2021-11-17 | Stop reason: HOSPADM

## 2021-11-17 RX ORDER — PROMETHAZINE HYDROCHLORIDE 25 MG/1
12.5 SUPPOSITORY RECTAL EVERY 4 HOURS PRN
Status: DISCONTINUED | OUTPATIENT
Start: 2021-11-17 | End: 2021-11-17 | Stop reason: HOSPADM

## 2021-11-17 RX ORDER — HYDROMORPHONE HYDROCHLORIDE 1 MG/ML
0.2 INJECTION, SOLUTION INTRAMUSCULAR; INTRAVENOUS; SUBCUTANEOUS
Status: DISCONTINUED | OUTPATIENT
Start: 2021-11-17 | End: 2021-11-17 | Stop reason: HOSPADM

## 2021-11-17 RX ORDER — DIPHENHYDRAMINE HYDROCHLORIDE 50 MG/ML
12.5 INJECTION INTRAMUSCULAR; INTRAVENOUS
Status: DISCONTINUED | OUTPATIENT
Start: 2021-11-17 | End: 2021-11-17 | Stop reason: HOSPADM

## 2021-11-17 RX ORDER — BUPIVACAINE HYDROCHLORIDE AND EPINEPHRINE 2.5; 5 MG/ML; UG/ML
INJECTION, SOLUTION EPIDURAL; INFILTRATION; INTRACAUDAL; PERINEURAL
Status: DISCONTINUED | OUTPATIENT
Start: 2021-11-17 | End: 2021-11-17 | Stop reason: HOSPADM

## 2021-11-17 RX ORDER — IBUPROFEN 600 MG/1
600 TABLET ORAL EVERY 6 HOURS PRN
Qty: 30 TABLET | Refills: 0 | Status: SHIPPED | OUTPATIENT
Start: 2021-11-17

## 2021-11-17 RX ORDER — HYDROMORPHONE HYDROCHLORIDE 1 MG/ML
0.1 INJECTION, SOLUTION INTRAMUSCULAR; INTRAVENOUS; SUBCUTANEOUS
Status: DISCONTINUED | OUTPATIENT
Start: 2021-11-17 | End: 2021-11-17 | Stop reason: HOSPADM

## 2021-11-17 RX ORDER — LIDOCAINE HYDROCHLORIDE 20 MG/ML
INJECTION, SOLUTION EPIDURAL; INFILTRATION; INTRACAUDAL; PERINEURAL PRN
Status: DISCONTINUED | OUTPATIENT
Start: 2021-11-17 | End: 2021-11-17 | Stop reason: SURG

## 2021-11-17 RX ORDER — OXYCODONE HCL 5 MG/5 ML
5 SOLUTION, ORAL ORAL
Status: DISCONTINUED | OUTPATIENT
Start: 2021-11-17 | End: 2021-11-17 | Stop reason: HOSPADM

## 2021-11-17 RX ORDER — HYDROMORPHONE HYDROCHLORIDE 1 MG/ML
0.4 INJECTION, SOLUTION INTRAMUSCULAR; INTRAVENOUS; SUBCUTANEOUS
Status: DISCONTINUED | OUTPATIENT
Start: 2021-11-17 | End: 2021-11-17 | Stop reason: HOSPADM

## 2021-11-17 RX ORDER — BUPIVACAINE HYDROCHLORIDE 2.5 MG/ML
INJECTION, SOLUTION EPIDURAL; INFILTRATION; INTRACAUDAL
Status: DISCONTINUED
Start: 2021-11-17 | End: 2021-11-17 | Stop reason: HOSPADM

## 2021-11-17 RX ORDER — EPINEPHRINE 1 MG/ML(1)
AMPUL (ML) INJECTION
Status: DISCONTINUED
Start: 2021-11-17 | End: 2021-11-17 | Stop reason: HOSPADM

## 2021-11-17 RX ORDER — ONDANSETRON 2 MG/ML
INJECTION INTRAMUSCULAR; INTRAVENOUS PRN
Status: DISCONTINUED | OUTPATIENT
Start: 2021-11-17 | End: 2021-11-17 | Stop reason: SURG

## 2021-11-17 RX ORDER — ONDANSETRON 4 MG/1
4 TABLET, ORALLY DISINTEGRATING ORAL EVERY 6 HOURS PRN
Qty: 10 TABLET | Refills: 0 | Status: SHIPPED | OUTPATIENT
Start: 2021-11-17 | End: 2022-02-25

## 2021-11-17 RX ORDER — SIMETHICONE 125 MG
125 TABLET,CHEWABLE ORAL 3 TIMES DAILY PRN
Status: DISCONTINUED | OUTPATIENT
Start: 2021-11-17 | End: 2021-11-17 | Stop reason: HOSPADM

## 2021-11-17 RX ADMIN — PROPOFOL 200 MG: 10 INJECTION, EMULSION INTRAVENOUS at 07:56

## 2021-11-17 RX ADMIN — MIDAZOLAM HYDROCHLORIDE 2 MG: 1 INJECTION, SOLUTION INTRAMUSCULAR; INTRAVENOUS at 07:56

## 2021-11-17 RX ADMIN — MEPERIDINE HYDROCHLORIDE 6.25 MG: 25 INJECTION INTRAMUSCULAR; INTRAVENOUS; SUBCUTANEOUS at 09:03

## 2021-11-17 RX ADMIN — LIDOCAINE HYDROCHLORIDE 100 MG: 20 INJECTION, SOLUTION EPIDURAL; INFILTRATION; INTRACAUDAL at 07:56

## 2021-11-17 RX ADMIN — KETOROLAC TROMETHAMINE 30 MG: 30 INJECTION, SOLUTION INTRAMUSCULAR at 08:50

## 2021-11-17 RX ADMIN — FENTANYL CITRATE 100 MCG: 50 INJECTION, SOLUTION INTRAMUSCULAR; INTRAVENOUS at 07:56

## 2021-11-17 RX ADMIN — ONDANSETRON 4 MG: 2 INJECTION INTRAMUSCULAR; INTRAVENOUS at 08:50

## 2021-11-17 ASSESSMENT — FIBROSIS 4 INDEX: FIB4 SCORE: .5769230769230769231

## 2021-11-17 NOTE — DISCHARGE INSTRUCTIONS
ACTIVITY: Rest and take it easy for the first 24 hours.  A responsible adult is recommended to remain with you during that time.  It is normal to feel sleepy.  We encourage you to not do anything that requires balance, judgment or coordination.    MILD FLU-LIKE SYMPTOMS ARE NORMAL. YOU MAY EXPERIENCE GENERALIZED MUSCLE ACHES, THROAT IRRITATION, HEADACHE AND/OR SOME NAUSEA.    FOR 24 HOURS DO NOT:  Drive, operate machinery or run household appliances.  Drink beer or alcoholic beverages.   Make important decisions or sign legal documents.    SPECIAL INSTRUCTIONS: SEE ATTACHED HANDOUT    DIET: To avoid nausea, slowly advance diet as tolerated, avoiding spicy or greasy foods for the first day.  Add more substantial food to your diet according to your physician's instructions.  INCREASE FLUIDS AND FIBER TO AVOID CONSTIPATION.    SURGICAL DRESSING/BATHING: SEE ATTACHED HANDOUT    FOLLOW-UP APPOINTMENT:  A follow-up appointment should be arranged with your doctor; call to schedule.    You should CALL YOUR PHYSICIAN if you develop:  Fever greater than 101 degrees F.  Pain not relieved by medication, or persistent nausea or vomiting.  Excessive bleeding (blood soaking through dressing) or unexpected drainage from the wound.  Extreme redness or swelling around the incision site, drainage of pus or foul smelling drainage.  Inability to urinate or empty your bladder within 8 hours.  Problems with breathing or chest pain.    You should call 911 if you develop problems with breathing or chest pain.  If you are unable to contact your doctor or surgical center, you should go to the nearest emergency room or urgent care center.  Physician's telephone #: DR. LEONARD [phone]    If any questions arise, call your doctor.  If your doctor is not available, please feel free to call the Surgical Center at [phone]. The Contact Center is open Monday through Friday 7AM to 5PM and may speak to a nurse at [phone], or toll  free at [phone].     A registered nurse may call you a few days after your surgery to see how you are doing after your procedure.    MEDICATIONS: Resume taking daily medication.  Take prescribed pain medication with food.  If no medication is prescribed, you may take non-aspirin pain medication if needed.  PAIN MEDICATION CAN BE VERY CONSTIPATING.  Take a stool softener or laxative such as senokot, pericolace, or milk of magnesia if needed.    Prescription given for _zofran and motrin.  Last pain medication given at ______________________.    If your physician has prescribed pain medication that includes Acetaminophen (Tylenol), do not take additional Acetaminophen (Tylenol) while taking the prescribed medication.    Depression / Suicide Risk    As you are discharged from this [facility] facility, it is important to learn how to keep safe from harming yourself.    Recognize the warning signs:  · Abrupt changes in personality, positive or negative- including increase in energy   · Giving away possessions  · Change in eating patterns- significant weight changes-  positive or negative  · Change in sleeping patterns- unable to sleep or sleeping all the time   · Unwillingness or inability to communicate  · Depression  · Unusual sadness, discouragement and loneliness  · Talk of wanting to die  · Neglect of personal appearance   · Rebelliousness- reckless behavior  · Withdrawal from people/activities they love  · Confusion- inability to concentrate     If you or a loved one observes any of these behaviors or has concerns about self-harm, here's what you can do:  · Talk about it- your feelings and reasons for harming yourself  · Remove any means that you might use to hurt yourself (examples: pills, rope, extension cords, firearm)  · Get professional help from the community (Mental Health, Substance Abuse, psychological counseling)  · Do not be alone:Call your Safe Contact- someone whom you trust who will be there  for you.  · Call your local CRISIS HOTLINE 172-6558 or 222-429-6522  · Call your local Children's Mobile Crisis Response Team [place] (059) 574-6166 or www.Employma  · Call the toll free National Suicide Prevention Hotlines   · National Suicide Prevention Lifeline 915-190-NOZI (5171)  · National [employer] Line Network 800-SUICIDE (938-5228)

## 2021-11-17 NOTE — OR NURSING
"0839: patient arrived from OR, report received, attached to monitoring. VSS, patient oxygenating well on 6 L oxymask, peripad in place, no blood noted at this moment    0901: telephone updates to , Jose David    0903: IV demerol 6.25 mg given for shivering, patient denies pain and nausea at this moment     0930: patient up to bathroom to void, patient reports small \"trickle\", RN unable to visualize urine, will attempt to void one more time, patient changed into clothes,     0942: patient to phase II,  brought to bedside, minimal shivering now, pain tolerable, denies nausea, tolerating PO    0959: discharge instructions given to , questions answered    1029: patient up to bathroom to void, void successful, PIV d/c tip intact    1034: patient d/c home in stable condition, vss, denies pain and nausea, all belongings with patient and accounted for, escorted out via wheelchair with JAYESH Moreno,               " [Negative] : Neurological

## 2021-11-17 NOTE — ANESTHESIA TIME REPORT
Anesthesia Start and Stop Event Times     Date Time Event    11/17/2021 0756 Ready for Procedure     0756 Anesthesia Start     0857 Anesthesia Stop        Responsible Staff  11/17/21    Name Role Begin End    Arslan Walker M.D. Anesth 0756 0857        Preop Diagnosis (Free Text):  Pre-op Diagnosis     MENORRHAGIA, ENDOMETRIAL POLYP        Preop Diagnosis (Codes):    Premium Reason  Non-Premium    Comments:

## 2021-11-17 NOTE — OR SURGEON
Immediate Post OP Note    PreOp Diagnosis: menorraghia, suspected polyp, desires ablation    PostOp Diagnosis: same      Procedure(s):  HYSTEROSCOPY, WITH TISSUE REMOVAL, USING   HYSTEROSCOPY, WITH ENDOMETRIAL RADIOFREQUENCY ABLATION -  - Wound Class: Clean Contaminated    Surgeon(s):  Jessica Wei M.D.    Anesthesiologist/Type of Anesthesia:  Anesthesiologist: Arslan Walker M.D./General    Surgical Staff:  Circulator: Kim Marroquin R.N.  Relief Circulator: Natalya Araiza R.N.  Scrub Person: Elsy Veronica    Specimens removed if any:  ID Type Source Tests Collected by Time Destination   A : endometrial sample Other Other PATHOLOGY SPECIMEN Jessica Wei M.D. 11/17/2021  8:30 AM        Estimated Blood Loss: <25ml    Findings: normal endometrium, no polyp was seen; normal tubal ostia    Complications: none; dict [id number]        11/17/2021 8:33 AM Jessica Wei M.D.

## 2021-11-17 NOTE — ANESTHESIA PROCEDURE NOTES
Airway    Date/Time: 11/17/2021 7:57 AM  Performed by: Arslan Walker M.D.  Authorized by: Arslan Walker M.D.     Location:  OR  Urgency:  Elective  Indications for Airway Management:  Anesthesia      Spontaneous Ventilation: absent    Sedation Level:  Deep  Preoxygenated: Yes    Final Airway Type:  Supraglottic airway  Final Supraglottic Airway:  Standard LMA    SGA Size:  3  Number of Attempts at Approach:  1

## 2021-11-17 NOTE — ANESTHESIA POSTPROCEDURE EVALUATION
Patient: Berenice Ng    Procedure Summary     Date: 11/17/21 Room / Location: [facility] ROOM 24 / SURGERY SAME DAY [facility]    Anesthesia Start: 0756 Anesthesia Stop: 0851    Procedures:       HYSTEROSCOPY, WITH TISSUE REMOVAL, USING HYSTEROSCOPIC ROTATING CUTTER BLADE (N/A )      HYSTEROSCOPY, WITH ENDOMETRIAL RADIOFREQUENCY ABLATION - WITH POLYPECTOMY (N/A ) Diagnosis: (menorrhagia, endometrial polyp)    Surgeons: Jessica Wei M.D. Responsible Provider: Arslan Walker M.D.    Anesthesia Type: general ASA Status: 2          Final Anesthesia Type: general  Last vitals  BP   Blood Pressure: 134/76    Temp   36.1 °C (97 °F)    Pulse   92   Resp   16    SpO2   100 %      Anesthesia Post Evaluation    Patient location during evaluation: PACU  Patient participation: complete - patient participated  Level of consciousness: awake and alert    Airway patency: patent  Anesthetic complications: no  Cardiovascular status: hemodynamically stable  Respiratory status: acceptable  Hydration status: euvolemic    PONV: none          There were no known complications for this encounter.

## 2021-11-17 NOTE — ANESTHESIA PREPROCEDURE EVALUATION
Case: [id number] Date/Time: 11/17/21 0715    Procedures:       HYSTEROSCOPY, WITH TISSUE REMOVAL, USING HYSTEROSCOPIC ROTATING CUTTER BLADE (N/A )      HYSTEROSCOPY, WITH ENDOMETRIAL RADIOFREQUENCY ABLATION - WITH POLYPECTOMY (N/A )    Anesthesia type: General    Pre-op diagnosis: MENORRHAGIA, ENDOMETRIAL POLYP    Location: [facility] ROOM 24 / SURGERY SAME DAY [facility]    Surgeons: Jessica Wei M.D.          Relevant Problems   NEURO   (positive) Tension headache       Physical Exam    Airway   Mallampati: II  TM distance: >3 FB  Neck ROM: full       Cardiovascular - normal exam  Rhythm: regular  Rate: normal  (-) murmur     Dental - normal exam           Pulmonary - normal exam  Breath sounds clear to auscultation     Abdominal    Neurological - normal exam                 Anesthesia Plan    ASA 2       Plan - general       Airway plan will be LMA          Induction: intravenous    Postoperative Plan: Postoperative administration of opioids is intended.    Pertinent diagnostic labs and testing reviewed    Informed Consent:    Anesthetic plan and risks discussed with patient.    Use of blood products discussed with: patient whom consented to blood products.

## 2021-11-17 NOTE — OP REPORT
DATE OF SERVICE:  11/17/2021     PREOPERATIVE DIAGNOSES:    1.  Menorrhagia.  2.  Ultrasound findings suspected endometrial polyp.  3.  Desires uterine ablation.     POSTOPERATIVE DIAGNOSES:  1.  Menorrhagia.  2.  Ultrasound findings suspected endometrial polyp.  3.  Desires uterine ablation.     PROCEDURES:    1.  Diagnostic hysteroscopy.  2.  NovaSure endometrial ablation.     SURGEON:  Jessica Wei MD     ANESTHESIOLOGIST:  Arslan Walker MD     TYPE OF ANESTHESIA:  General.     SPECIMENS:  Endometrial sample.     ESTIMATED BLOOD LOSS:  Less than 25 mL     FINDINGS:  Normal appearing endometrium.  No polyp was visualized.  Cavity was   normal.  Tubal ostia were normal bilaterally.  Uterus sounded to 8 cm,   cervical length was 3.5 cm for cavity length of 4.5 cm, cavity width was found   to be 2.6 cm. Length of ablation was 1 minute 25 seconds.     COMPLICATIONS:  None.     PROCEDURE NOTE: After informed consent was obtained, the patient was taken to   the operating room where she was given general anesthesia and intubated.  She   was placed in lithotomy position with well-padded Caleb stirrups.  She was   sterilely prepped and draped.  A speculum was placed into the vagina.  Cervix   was visualized and grasped with a single tooth tenaculum on the posterior lip.    Uterus was found to be retroverted on examination. Uterus was sounded to 8   cm and cervical length was determined to be 3.5 cm.  The cervix was dilated   slightly more.  It was already dilated up and hysteroscope was placed through   the cervical os.  The uterus distended well and I had excellent visualization   using normal saline as the media and I could see that the endometrium lining   was normal.  There were no polyps.  I pulled down to the cervix.  There was   also nothing.  No pathology. I removed some endometrial tissue and that was   handed off as a specimen.  The hysteroscope portion was then discontinued.    The NovaSure device was  hooked up and the cavity length was set to 4-1/2 on   initial retraction and opening of the fan portion of the NovaSure the cavity   width appeared to be 3.1 cm. Cavity assessment was done and it said we did not   pass.  I removed the NovaSure device and looked again with the hysteroscope   and I had good distention and there was no evidence of perforation.  I   replaced the NovaSure device and the width then would only go to 2.6 cm.    Despite multiple maneuvers, I could not get the width to go beyond that, which   was interesting given that it had gone to 3.1 before, so I tried another   device.  This device also only went to 2.6 cm in the width, so we used that as   a width, another cavity assessment was done and was cleared and the ablation   was completed in approximately 90 seconds.  NovaSure device was then removed.    I did replace the hysteroscope just to look and it appeared that I was able   to distend the cavity again and the ablation appeared to be complete and there   was no evidence of uterine perforation.  All instruments were removed from   the vagina.  The patient was then awoken and taken to recovery room in stable   condition.  Sponge and needle counts were correct.  There were no   complications.        ______________________________  MD NIEVES Roca/LITZY    DD:  11/17/2021 08:41  DT:  11/17/2021 09:06    Job#:  [id number]

## 2022-02-25 ENCOUNTER — OFFICE VISIT (OUTPATIENT)
Dept: MEDICAL GROUP | Facility: PHYSICIAN GROUP | Age: 42
End: 2022-02-25
Payer: COMMERCIAL

## 2022-02-25 VITALS
DIASTOLIC BLOOD PRESSURE: 74 MMHG | RESPIRATION RATE: 20 BRPM | HEIGHT: 68 IN | OXYGEN SATURATION: 97 % | WEIGHT: 183.25 LBS | SYSTOLIC BLOOD PRESSURE: 128 MMHG | BODY MASS INDEX: 27.77 KG/M2 | TEMPERATURE: 98.3 F | HEART RATE: 100 BPM

## 2022-02-25 DIAGNOSIS — R09.81 SINUS CONGESTION: ICD-10-CM

## 2022-02-25 DIAGNOSIS — R05.9 COUGH: ICD-10-CM

## 2022-02-25 PROCEDURE — 99213 OFFICE O/P EST LOW 20 MIN: CPT | Performed by: INTERNAL MEDICINE

## 2022-02-25 RX ORDER — FLUCONAZOLE 150 MG/1
150 TABLET ORAL DAILY
Qty: 1 TABLET | Refills: 2 | Status: SHIPPED | OUTPATIENT
Start: 2022-02-25

## 2022-02-25 RX ORDER — METHYLPREDNISOLONE 4 MG/1
TABLET ORAL
Qty: 21 TABLET | Refills: 0 | Status: SHIPPED | OUTPATIENT
Start: 2022-02-25

## 2022-02-25 RX ORDER — AZITHROMYCIN 250 MG/1
TABLET, FILM COATED ORAL
Qty: 6 TABLET | Refills: 0 | Status: SHIPPED | OUTPATIENT
Start: 2022-02-25

## 2022-02-25 ASSESSMENT — PATIENT HEALTH QUESTIONNAIRE - PHQ9: CLINICAL INTERPRETATION OF PHQ2 SCORE: 0

## 2022-02-25 ASSESSMENT — FIBROSIS 4 INDEX: FIB4 SCORE: .5769230769230769231

## 2022-02-25 NOTE — PROGRESS NOTES
"Subjective:     CC:   Chief Complaint   Patient presents with   • Sinusitis   • Cough       HPI:   Berenice presents today to discuss the following issues:    The patient reports a 2-week history of sinus congestion and cough.  She has been using Mucinex.  She reports a mild runny nose.  She otherwise has no symptoms including no fevers, headaches.  No known sick contacts.  She did use a harsh chemical to clean some hard water stains in her bathroom and thought that maybe this precipitated her symptoms, but it has not gotten better.  She has had previous sinus infections, but none recently.      Past Medical History:   Diagnosis Date   • Anesthesia     \"epidural wore off quickly, woke up during surgery\"   • History of COVID-19 10/04/2021    pt states cough resolved 10/12/21, pt was not hospitalized.    • Menorrhagia        Social History     Tobacco Use   • Smoking status: Never Smoker   • Smokeless tobacco: Never Used   Vaping Use   • Vaping Use: Never used   Substance Use Topics   • Alcohol use: Not Currently   • Drug use: Never       Current Outpatient Medications Ordered in Epic   Medication Sig Dispense Refill   • azithromycin (ZITHROMAX) 250 MG Tab Take two tablets on the first day, then one tablet daily for 4 days. 6 Tablet 0   • methylPREDNISolone (MEDROL DOSEPAK) 4 MG Tablet Therapy Pack As directed on the packaging label. 21 Tablet 0   • fluconazole (DIFLUCAN) 150 MG tablet Take 1 Tablet by mouth every day. 1 Tablet 2   • ibuprofen (MOTRIN) 600 MG Tab Take 1 Tablet by mouth every 6 hours as needed. 30 Tablet 0     No current Epic-ordered facility-administered medications on file.       Allergies:  Other drug    Health Maintenance: Completed    ROS:   Per HPI      Objective:       Exam:  /74 (BP Location: Left arm, Patient Position: Sitting, BP Cuff Size: Adult)   Pulse 100   Temp 36.8 °C (98.3 °F) (Temporal)   Resp 20   Ht 1.715 m (5' 7.5\")   Wt 83.1 kg (183 lb 4 oz)   SpO2 97%   Breastfeeding " No   BMI 28.28 kg/m²  Body mass index is 28.28 kg/m².    Gen: Alert and oriented, No apparent distress.  ENT: Oropharynx without erythema, no LAD  Lungs: Normal effort, CTA bilaterally, no wheezes, rhonchi, or rales  CV: Regular rate and rhythm. No murmurs, rubs, or gallops.  Ext: No clubbing, cyanosis, edema.        Assessment & Plan:     41 y.o. female with the following -     Cough  Sinus congestion  This is an acute condition.  The patient reports a 2-week history of a cough and sinus congestion.  She has been using Mucinex for her symptoms.  She denies any other upper respiratory symptoms.  - azithromycin (ZITHROMAX) 250 MG Tab; Take two tablets on the first day, then one tablet daily for 4 days.  Dispense: 6 Tablet; Refill: 0  - methylPREDNISolone (MEDROL DOSEPAK) 4 MG Tablet Therapy Pack; As directed on the packaging label.  Dispense: 21 Tablet; Refill: 0  - fluconazole (DIFLUCAN) 150 MG tablet; Take 1 Tablet by mouth every day.  Dispense: 1 Tablet; Refill: 2    Return if symptoms worsen or fail to improve.    Please note that this dictation was created using voice recognition software. I have made every reasonable attempt to correct obvious errors, but I expect that there are errors of grammar and possibly content that I did not discover before finalizing the note.

## 2022-03-03 DIAGNOSIS — R05.9 COUGH: ICD-10-CM

## 2022-03-03 RX ORDER — BENZONATATE 100 MG/1
100 CAPSULE ORAL 3 TIMES DAILY PRN
Qty: 60 CAPSULE | Refills: 0 | Status: SHIPPED | OUTPATIENT
Start: 2022-03-03

## 2022-03-04 ENCOUNTER — HOSPITAL ENCOUNTER (OUTPATIENT)
Dept: RADIOLOGY | Facility: MEDICAL CENTER | Age: 42
End: 2022-03-04
Attending: INTERNAL MEDICINE
Payer: COMMERCIAL

## 2022-03-04 DIAGNOSIS — R05.9 COUGH: ICD-10-CM

## 2022-03-04 PROCEDURE — 71046 X-RAY EXAM CHEST 2 VIEWS: CPT
